# Patient Record
Sex: MALE | Race: WHITE
[De-identification: names, ages, dates, MRNs, and addresses within clinical notes are randomized per-mention and may not be internally consistent; named-entity substitution may affect disease eponyms.]

---

## 2020-02-25 ENCOUNTER — HOSPITAL ENCOUNTER (EMERGENCY)
Dept: HOSPITAL 26 - MED | Age: 50
Discharge: HOME | End: 2020-02-25
Payer: COMMERCIAL

## 2020-02-25 VITALS — DIASTOLIC BLOOD PRESSURE: 90 MMHG | SYSTOLIC BLOOD PRESSURE: 134 MMHG

## 2020-02-25 VITALS — SYSTOLIC BLOOD PRESSURE: 143 MMHG | DIASTOLIC BLOOD PRESSURE: 88 MMHG

## 2020-02-25 VITALS — WEIGHT: 190 LBS | BODY MASS INDEX: 30.53 KG/M2 | HEIGHT: 66 IN

## 2020-02-25 DIAGNOSIS — S39.012A: Primary | ICD-10-CM
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
## 2020-02-25 DIAGNOSIS — S66.912A: ICD-10-CM

## 2020-02-25 PROCEDURE — 73110 X-RAY EXAM OF WRIST: CPT

## 2020-02-25 PROCEDURE — 72110 X-RAY EXAM L-2 SPINE 4/>VWS: CPT

## 2020-02-25 PROCEDURE — 96372 THER/PROPH/DIAG INJ SC/IM: CPT

## 2020-02-25 PROCEDURE — 72220 X-RAY EXAM SACRUM TAILBONE: CPT

## 2020-02-25 PROCEDURE — 99284 EMERGENCY DEPT VISIT MOD MDM: CPT

## 2020-02-25 NOTE — NUR
FELL FROM A FLATBED TRUCK DURING WORK TODAY---PT WAS FACING AWAY FROM THE OPEN 
END WHEN FELL ONTO BACK---DENIES HEAD INJURY

LOWER BACK PAIN UNABLE TO AMBULATE WITHOUT PAIN---ABRASIONS TO FOREARMS

## 2020-02-25 NOTE — NUR
Patient discharged with v/s stable. Written and verbal after care instructions 
about back pain, vertebral fracture, and wrist pain given and explained. 

Patient alert, oriented and verbalized understanding of instructions. 
Ambulatory with steady gait. All questions addressed prior to discharge. ID 
band removed. Patient advised to follow up with PMD. Rx of medrol and ibuprofen 
given. Patient educated on indication of medication including possible reaction 
and side effects. Opportunity to ask questions provided and answered. Discharge 
done in Turkish. Patient understands they cannot drive, daughter states she 
will drive pt.